# Patient Record
Sex: FEMALE | Race: WHITE | NOT HISPANIC OR LATINO | URBAN - NONMETROPOLITAN AREA
[De-identification: names, ages, dates, MRNs, and addresses within clinical notes are randomized per-mention and may not be internally consistent; named-entity substitution may affect disease eponyms.]

---

## 2019-06-03 NOTE — PATIENT DISCUSSION
Continue: Istalol (timolol maleate): drops, once daily: 0.5% 1 drop twice a day as directed into both eyes

## 2019-06-03 NOTE — PATIENT DISCUSSION
Continue: TobraDex (tobramycin-dexamethasone): ointment: 0.3-0.1% 1 a small amount at bedtime as directed into both eyes

## 2019-06-03 NOTE — PATIENT DISCUSSION
POAG, OU: GOOD INTRAOCULAR PRESSURE, OU.  CONTINUE TIMOLOL QDAY OU AND LUMIGAN QHS OU. RETURN FOR FOLLOW AS SCHEDULED.

## 2019-06-03 NOTE — PATIENT DISCUSSION
CATARACTS, OU - VISUALLY SIGNIFICANT. PT TO CALL WHEN READY TO PROCEED WITH SURGERY. SCHEDULE  IF VISUAL SYMPTOMS PERSIST.

## 2019-06-03 NOTE — PATIENT DISCUSSION
MODERATE DRY EYES: PRESCRIBED PRESERVATIVE FREE ARTIFICIAL TEARS 4-6X A DAY, OU AND THE DAILY INTAKE OF OMEGA-3 DHA/EPA FATTY ACIDS TO HELP RELIEVE SYMPTOMS. ADD NIGHTLY LUBRICATING OINTMENT OR GEL. WILL CONSIDER RESTASIS OR PUNCTAL PLUGS AND/OR LIPIFLOW TREATMENT NEXT VISIT IF NOT RESPONSIVE OR IF SYMPTOMS PERSIST. RETURN FOR FOLLOW-UP AS SCHEDULED OR SOONER IF SYMPTOMS WORSEN. LIJ ASU (Adult):

## 2019-09-10 ENCOUNTER — IMPORTED ENCOUNTER (OUTPATIENT)
Dept: URBAN - NONMETROPOLITAN AREA CLINIC 1 | Facility: CLINIC | Age: 49
End: 2019-09-10

## 2019-09-10 PROCEDURE — 92310 CONTACT LENS FITTING OU: CPT

## 2019-09-10 PROCEDURE — 92015 DETERMINE REFRACTIVE STATE: CPT

## 2019-09-10 PROCEDURE — 92014 COMPRE OPH EXAM EST PT 1/>: CPT

## 2019-10-11 NOTE — PATIENT DISCUSSION
Stopped Today: TobraDex (tobramycin-dexamethasone): ointment: 0.3-0.1% 1 a small amount at bedtime as directed into both eyes

## 2019-10-11 NOTE — PATIENT DISCUSSION
POAG, OU: GOOD INTRAOCULAR PRESSURE, OU. CONTINUE ISTALOL QDAY OU AND LUMIGAN QHS OU. E-SCRIBED REFILLS TODAY. DISCUSSED VF CHANGES THAT CORRELATE TO RNFL. GONIO DONE TODAY.  ADD SLT OS THEN OD.

## 2019-12-02 NOTE — PATIENT DISCUSSION
POAG, OU: INTRAOCULAR PRESSURE IS WITHIN ACCEPTABLE LIMITS. PT INSTRUCTED TO CONTINUE ISTALOL, LUMIGAN AND RETURN FOR FOLLOW-UP AS SCHEDULED.

## 2019-12-02 NOTE — PATIENT DISCUSSION
New Prescription: TobraDex (tobramycin-dexamethasone): ointment: 0.3-0.1% 1 a small amount at bedtime as directed into both eyes 12-

## 2019-12-02 NOTE — PATIENT DISCUSSION
CONJUNCTIVITIS (ALLERGIC), OU:  PRESCRIBE PAZEO QDAY. OK TO USE TOBRADEX SPARINGLY FOR EYELID IRRITATION. RETURN FOR FOLLOW-UP AS SCHEDULED.

## 2020-04-06 NOTE — PATIENT DISCUSSION
CONJUNCTIVITIS (ALLERGIC), OU: CONTINUE PAZEO QDAY. OK TO USE TOBRADEX SPARINGLY FOR EYELID IRRITATION. RETURN FOR FOLLOW-UP AS SCHEDULED.

## 2020-09-15 ENCOUNTER — IMPORTED ENCOUNTER (OUTPATIENT)
Dept: URBAN - NONMETROPOLITAN AREA CLINIC 1 | Facility: CLINIC | Age: 50
End: 2020-09-15

## 2020-09-15 PROCEDURE — 92310 CONTACT LENS FITTING OU: CPT

## 2020-09-15 PROCEDURE — 92014 COMPRE OPH EXAM EST PT 1/>: CPT

## 2020-09-15 PROCEDURE — 92015 DETERMINE REFRACTIVE STATE: CPT

## 2021-09-20 ENCOUNTER — IMPORTED ENCOUNTER (OUTPATIENT)
Dept: URBAN - NONMETROPOLITAN AREA CLINIC 1 | Facility: CLINIC | Age: 51
End: 2021-09-20

## 2021-09-20 PROCEDURE — 92015 DETERMINE REFRACTIVE STATE: CPT

## 2021-09-20 PROCEDURE — 92014 COMPRE OPH EXAM EST PT 1/>: CPT

## 2021-09-20 PROCEDURE — 92310 CONTACT LENS FITTING OU: CPT

## 2021-09-20 NOTE — PATIENT DISCUSSION
Myopia-Discussed diagnosis with patient. -Explained that people who are myopic are at a higher risk for developing RD/RT and reviewed associated S&S.-Pt to contact our office if symptoms develop. Astigmatism-Discussed diagnosis with patient. Updated spec Rx given. Recommend lens that will provide comfort as well as protect safety and health of eyes. CL wear-CLs fit and center well.-Stressed that patient should not sleep in CL. -Updated CL Rx given. -CL care and precautions given. TRIALS GIVEN -11.50 OU PT TO CALL FOR -11.50 OR -12.00 OU

## 2021-10-08 NOTE — PATIENT DISCUSSION
VISUALLY SIGNIFICANT: I have discussed the option of glasses versus cataract surgery versus following. It was explained that when the patients vision no longer meets their visual needs and a glasses prescription does not improve visual symptoms, the option of cataract surgery is a reasonable next step. It was explained that there is no guarantee that removing the cataract will improve their visual symptoms, however; it is believed that the cataract is contributing to the patient's visual impairment and surgery may improve both the visual and functional status of the patient. The risks, benefits and alternatives of surgery were discussed with the patient.

## 2021-10-08 NOTE — PATIENT DISCUSSION
EVAPORATIVE: Most dry eye is caused by blockage of the meibomian glands along the lid margin which leads to evaporative dry eye disease. This causes instability of tear film and blurry vision. Itching of the lash line and a chronic foreign body sensation often accompany meibomian gland disease. Often systemic conditions such as rosacea can exacerbate these symptoms, and treatment plans may involve modification of systemic and environmental factors. If left untreated, evaporative ocular surface disease can cause corneal damage from tear film insufficiency or eyelid damage from inflammation, both of which could lead to vision loss. Successful management is dependent on compliance with the treatment regimen.

## 2022-04-10 ASSESSMENT — VISUAL ACUITY
OD_CC: J1
OD_SC: 20/30-1
OS_SC: 20/20
OS_SC: 20/30-2
OS_SC: 20/40
OU_SC: 20/30
OD_SC: 20/30-1
OD_SC: 20/20
OS_SC: 20/30-2
OS_CC: J1
OU_SC: 20/30
OD_SC: 20/30-1

## 2022-04-10 ASSESSMENT — TONOMETRY
OS_IOP_MMHG: 19
OD_IOP_MMHG: 17
OD_IOP_MMHG: 17
OS_IOP_MMHG: 17
OD_IOP_MMHG: 17
OS_IOP_MMHG: 17

## 2024-04-11 ENCOUNTER — ESTABLISHED PATIENT (OUTPATIENT)
Dept: RURAL CLINIC 1 | Facility: CLINIC | Age: 54
End: 2024-04-11

## 2024-04-11 DIAGNOSIS — H35.341: ICD-10-CM

## 2024-04-11 DIAGNOSIS — H52.13: ICD-10-CM

## 2024-04-11 PROCEDURE — 92015 DETERMINE REFRACTIVE STATE: CPT

## 2024-04-11 PROCEDURE — 92014 COMPRE OPH EXAM EST PT 1/>: CPT

## 2024-04-11 ASSESSMENT — TONOMETRY
OD_IOP_MMHG: 12
OS_IOP_MMHG: 12

## 2024-04-11 ASSESSMENT — VISUAL ACUITY
OU_CC: 20/25
OS_CC: 20/25
OU_CC: 20/25
OD_CC: 20/400
OS_CC: 20/30

## 2024-10-14 ENCOUNTER — CONSULTATION/EVALUATION (OUTPATIENT)
Dept: RURAL CLINIC 1 | Facility: CLINIC | Age: 54
End: 2024-10-14

## 2024-10-14 DIAGNOSIS — H35.413: ICD-10-CM

## 2024-10-14 DIAGNOSIS — H25.13: ICD-10-CM

## 2024-10-14 DIAGNOSIS — H52.13: ICD-10-CM

## 2024-10-14 DIAGNOSIS — E11.9: ICD-10-CM

## 2024-10-14 DIAGNOSIS — Z98.890: ICD-10-CM

## 2024-10-14 PROCEDURE — 92136 OPHTHALMIC BIOMETRY: CPT

## 2024-10-14 PROCEDURE — 92025 CPTRIZED CORNEAL TOPOGRAPHY: CPT | Mod: NC

## 2024-10-14 PROCEDURE — 92134 CPTRZ OPH DX IMG PST SGM RTA: CPT | Mod: NC

## 2024-10-14 PROCEDURE — 99214 OFFICE O/P EST MOD 30 MIN: CPT

## 2024-10-28 ENCOUNTER — PRE-OP/H&P (OUTPATIENT)
Dept: RURAL CLINIC 1 | Facility: CLINIC | Age: 54
End: 2024-10-28

## 2024-10-28 VITALS
SYSTOLIC BLOOD PRESSURE: 115 MMHG | DIASTOLIC BLOOD PRESSURE: 70 MMHG | HEART RATE: 85 BPM | HEIGHT: 66 IN | BODY MASS INDEX: 26.36 KG/M2 | WEIGHT: 164 LBS

## 2024-10-28 DIAGNOSIS — H25.13: ICD-10-CM

## 2024-10-28 DIAGNOSIS — Z01.818: ICD-10-CM

## 2024-10-28 PROCEDURE — 99499 UNLISTED E&M SERVICE: CPT

## 2024-11-05 ENCOUNTER — SURGERY/PROCEDURE (OUTPATIENT)
Dept: URBAN - METROPOLITAN AREA SURGERY 3 | Facility: SURGERY | Age: 54
End: 2024-11-05

## 2024-11-05 DIAGNOSIS — H25.11: ICD-10-CM

## 2024-11-05 PROCEDURE — 66984 XCAPSL CTRC RMVL W/O ECP: CPT

## 2024-11-06 ENCOUNTER — POST-OP (OUTPATIENT)
Dept: RURAL CLINIC 1 | Facility: CLINIC | Age: 54
End: 2024-11-06

## 2024-11-06 DIAGNOSIS — Z96.1: ICD-10-CM

## 2024-11-06 PROCEDURE — 99024 POSTOP FOLLOW-UP VISIT: CPT

## 2024-11-13 ENCOUNTER — POST-OP (OUTPATIENT)
Dept: RURAL CLINIC 1 | Facility: CLINIC | Age: 54
End: 2024-11-13

## 2024-11-13 DIAGNOSIS — Z96.1: ICD-10-CM

## 2024-11-13 PROCEDURE — 99024 POSTOP FOLLOW-UP VISIT: CPT

## 2024-11-19 ENCOUNTER — SURGERY/PROCEDURE (OUTPATIENT)
Dept: URBAN - METROPOLITAN AREA SURGERY 3 | Facility: SURGERY | Age: 54
End: 2024-11-19

## 2024-11-19 DIAGNOSIS — H25.12: ICD-10-CM

## 2024-11-19 PROCEDURE — 66984 XCAPSL CTRC RMVL W/O ECP: CPT | Mod: 79,LT

## 2024-11-20 ENCOUNTER — POST-OP (OUTPATIENT)
Dept: RURAL CLINIC 1 | Facility: CLINIC | Age: 54
End: 2024-11-20

## 2024-11-20 DIAGNOSIS — Z96.1: ICD-10-CM

## 2024-11-20 PROCEDURE — 99024 POSTOP FOLLOW-UP VISIT: CPT

## 2024-12-02 ENCOUNTER — POST-OP (OUTPATIENT)
Age: 54
End: 2024-12-02

## 2024-12-02 DIAGNOSIS — Z96.1: ICD-10-CM

## 2024-12-02 PROCEDURE — 99024 POSTOP FOLLOW-UP VISIT: CPT

## 2025-04-07 ENCOUNTER — FOLLOW UP (OUTPATIENT)
Age: 55
End: 2025-04-07

## 2025-04-07 DIAGNOSIS — H26.493: ICD-10-CM

## 2025-04-07 DIAGNOSIS — E11.9: ICD-10-CM

## 2025-04-07 DIAGNOSIS — H35.413: ICD-10-CM

## 2025-04-07 DIAGNOSIS — Z96.1: ICD-10-CM

## 2025-04-07 PROCEDURE — 92014 COMPRE OPH EXAM EST PT 1/>: CPT
